# Patient Record
Sex: FEMALE | Race: BLACK OR AFRICAN AMERICAN | NOT HISPANIC OR LATINO | Employment: FULL TIME | URBAN - METROPOLITAN AREA
[De-identification: names, ages, dates, MRNs, and addresses within clinical notes are randomized per-mention and may not be internally consistent; named-entity substitution may affect disease eponyms.]

---

## 2021-07-23 LAB — POC BETA-HCG (QUAL): NEGATIVE

## 2021-09-27 LAB — POC BETA-HCG (QUAL): NEGATIVE

## 2022-04-10 ENCOUNTER — HISTORICAL (OUTPATIENT)
Dept: ADMINISTRATIVE | Facility: HOSPITAL | Age: 27
End: 2022-04-10
Payer: MEDICAID

## 2022-04-26 VITALS
BODY MASS INDEX: 23.71 KG/M2 | SYSTOLIC BLOOD PRESSURE: 111 MMHG | HEIGHT: 64 IN | DIASTOLIC BLOOD PRESSURE: 77 MMHG | WEIGHT: 138.88 LBS

## 2022-06-08 DIAGNOSIS — R87.629 ABNORMAL VAGINAL PAP SMEAR: Primary | ICD-10-CM

## 2022-09-21 ENCOUNTER — HISTORICAL (OUTPATIENT)
Dept: ADMINISTRATIVE | Facility: HOSPITAL | Age: 27
End: 2022-09-21
Payer: MEDICAID

## 2023-07-10 ENCOUNTER — HOSPITAL ENCOUNTER (EMERGENCY)
Facility: HOSPITAL | Age: 28
Discharge: HOME | End: 2023-07-11
Attending: FAMILY MEDICINE
Payer: MEDICAID

## 2023-07-10 DIAGNOSIS — N30.01 ACUTE CYSTITIS WITH HEMATURIA: Primary | ICD-10-CM

## 2023-07-10 LAB
B-HCG UR QL: NEGATIVE
CTP QC/QA: YES

## 2023-07-10 PROCEDURE — 87088 URINE BACTERIA CULTURE: CPT | Performed by: PHYSICIAN ASSISTANT

## 2023-07-10 PROCEDURE — 81025 URINE PREGNANCY TEST: CPT | Performed by: PHYSICIAN ASSISTANT

## 2023-07-10 PROCEDURE — 81001 URINALYSIS AUTO W/SCOPE: CPT | Performed by: PHYSICIAN ASSISTANT

## 2023-07-10 PROCEDURE — 99284 EMERGENCY DEPT VISIT MOD MDM: CPT

## 2023-07-10 RX ORDER — KETOROLAC TROMETHAMINE 10 MG/1
10 TABLET, FILM COATED ORAL
Status: COMPLETED | OUTPATIENT
Start: 2023-07-11 | End: 2023-07-11

## 2023-07-11 VITALS
DIASTOLIC BLOOD PRESSURE: 75 MMHG | HEIGHT: 64 IN | RESPIRATION RATE: 16 BRPM | WEIGHT: 163.25 LBS | SYSTOLIC BLOOD PRESSURE: 130 MMHG | HEART RATE: 80 BPM | TEMPERATURE: 98 F | OXYGEN SATURATION: 100 % | BODY MASS INDEX: 27.87 KG/M2

## 2023-07-11 LAB
APPEARANCE UR: ABNORMAL
BACTERIA #/AREA URNS AUTO: ABNORMAL /HPF
BILIRUB UR QL STRIP.AUTO: NEGATIVE MG/DL
COLOR UR: YELLOW
GLUCOSE UR QL STRIP.AUTO: NORMAL MG/DL
HYALINE CASTS #/AREA URNS LPF: ABNORMAL /LPF
KETONES UR QL STRIP.AUTO: NEGATIVE MG/DL
LEUKOCYTE ESTERASE UR QL STRIP.AUTO: 500 UNIT/L
MUCOUS THREADS URNS QL MICRO: ABNORMAL /LPF
NITRITE UR QL STRIP.AUTO: NEGATIVE
NON-SQ EPI CELLS URNS QL MICRO: ABNORMAL /HPF
PH UR STRIP.AUTO: 6 [PH]
PROT UR QL STRIP.AUTO: ABNORMAL MG/DL
RBC #/AREA URNS AUTO: >100 /HPF
RBC UR QL AUTO: ABNORMAL UNIT/L
SP GR UR STRIP.AUTO: 1.03
SQUAMOUS #/AREA URNS LPF: ABNORMAL /HPF
UNIDENT CRYS #/AREA URNS HPF: ABNORMAL /HPF
UROBILINOGEN UR STRIP-ACNC: ABNORMAL MG/DL
WBC #/AREA URNS AUTO: >100 /HPF
WBC CLUMPS UR QL AUTO: ABNORMAL /HPF

## 2023-07-11 PROCEDURE — 25000003 PHARM REV CODE 250: Performed by: PHYSICIAN ASSISTANT

## 2023-07-11 RX ORDER — PHENAZOPYRIDINE HYDROCHLORIDE 100 MG/1
100 TABLET, FILM COATED ORAL
Status: COMPLETED | OUTPATIENT
Start: 2023-07-11 | End: 2023-07-11

## 2023-07-11 RX ORDER — CIPROFLOXACIN 500 MG/1
500 TABLET ORAL 2 TIMES DAILY
Qty: 10 TABLET | Refills: 0 | Status: SHIPPED | OUTPATIENT
Start: 2023-07-11 | End: 2023-07-16

## 2023-07-11 RX ORDER — PHENAZOPYRIDINE HYDROCHLORIDE 100 MG/1
100 TABLET, FILM COATED ORAL 3 TIMES DAILY PRN
Qty: 9 TABLET | Refills: 0 | Status: SHIPPED | OUTPATIENT
Start: 2023-07-11

## 2023-07-11 RX ORDER — CIPROFLOXACIN 500 MG/1
500 TABLET ORAL
Status: COMPLETED | OUTPATIENT
Start: 2023-07-11 | End: 2023-07-11

## 2023-07-11 RX ADMIN — PHENAZOPYRIDINE 100 MG: 100 TABLET ORAL at 12:07

## 2023-07-11 RX ADMIN — KETOROLAC TROMETHAMINE 10 MG: 10 TABLET, FILM COATED ORAL at 12:07

## 2023-07-11 RX ADMIN — CIPROFLOXACIN 500 MG: 500 TABLET, FILM COATED ORAL at 01:07

## 2023-07-11 NOTE — ED PROVIDER NOTES
Encounter Date: 7/10/2023     History     Chief Complaint   Patient presents with    Dysuria     Pt c/o burning, painful urination today with pelvic pain and urinary frequency and increased urge.      Patient with no pmhx presents today c/o dysuria, increased frequency, and suprapubic discomfort that started today. She denies any other associated symptoms. No exacerbating or relieving factors.     The history is provided by the patient. No  was used.   Review of patient's allergies indicates:  No Known Allergies  No past medical history on file.  No past surgical history on file.  No family history on file.     Review of Systems   Constitutional:  Negative for chills and fever.   Respiratory:  Negative for cough, chest tightness and shortness of breath.    Cardiovascular:  Negative for chest pain, palpitations and leg swelling.   Gastrointestinal:  Negative for abdominal pain, constipation, diarrhea, nausea and vomiting.   Genitourinary:  Positive for dysuria and frequency. Negative for flank pain, hematuria, menstrual problem, vaginal bleeding, vaginal discharge and vaginal pain.   Musculoskeletal:  Negative for arthralgias and myalgias.   Skin:  Negative for rash.   Neurological:  Negative for syncope, light-headedness and headaches.   All other systems reviewed and are negative.    Physical Exam     Initial Vitals [07/10/23 2323]   BP Pulse Resp Temp SpO2   130/75 91 16 97.5 °F (36.4 °C) 100 %      MAP       --         Physical Exam    Nursing note and vitals reviewed.  Constitutional: She appears well-developed and well-nourished. She is not diaphoretic. No distress.   HENT:   Head: Normocephalic and atraumatic.   Mouth/Throat: Oropharynx is clear and moist. No oropharyngeal exudate.   Eyes: Conjunctivae and EOM are normal.   Neck: Neck supple.   Normal range of motion.  Cardiovascular:  Normal rate, regular rhythm, normal heart sounds and intact distal pulses.           Pulmonary/Chest:  Breath sounds normal. No respiratory distress.   Abdominal: Abdomen is soft. Bowel sounds are normal. She exhibits no distension. There is abdominal tenderness in the suprapubic area.   No right CVA tenderness.  No left CVA tenderness. There is no rebound and no guarding.   Musculoskeletal:         General: No edema.      Cervical back: Normal range of motion and neck supple.     Neurological: She is alert and oriented to person, place, and time. GCS score is 15. GCS eye subscore is 4. GCS verbal subscore is 5. GCS motor subscore is 6.   Skin: Skin is warm and dry. Capillary refill takes less than 2 seconds. No rash noted.   Psychiatric: She has a normal mood and affect.     ED Course   Procedures  Labs Reviewed   URINALYSIS, REFLEX TO URINE CULTURE - Abnormal; Notable for the following components:       Result Value    Appearance, UA Cloudy (*)     Protein, UA 2+ (*)     Blood, UA 3+ (*)     Urobilinogen, UA 1+ (*)     Leukocyte Esterase,  (*)     WBC, UA >100 (*)     WBC Clumps, UA Many (*)     Bacteria, UA Occ (*)     Squamous Epithelial Cells, UA Few (*)     Mucous, UA Trace (*)     Non-Squamos Epithelial, UA Trace (*)     RBC, UA >100 (*)     All other components within normal limits   CULTURE, URINE   POCT URINE PREGNANCY          Imaging Results    None          Medications   ciprofloxacin HCl tablet 500 mg (has no administration in time range)   ketorolac tablet 10 mg (10 mg Oral Given 7/11/23 0002)   phenazopyridine tablet 100 mg (100 mg Oral Given 7/11/23 0014)     Medical Decision Making:   History:   Old Records Summarized: other records and records from clinic visits.  Initial Assessment:   Patient with dysuria and urinary frequency that started today  Differential Diagnosis:   Urinary tract infection, interstitial cystitis, bladder spasms   Clinical Tests:   Lab Tests: Ordered and Reviewed  Patient is non-toxic appearing. Her vitals are stable. Labs reviewed and discussed. Rx for cipro and  pyridium sent to pharmacy. Advised to f/u with pcp. Stable for discharge. ED precautions given.            ED Course as of 07/11/23 0052   Tue Jul 11, 2023 0049 Occult Blood UA(!): 3+ [SA]   0049 Leukocytes, UA(!): 500 [SA]   0049 WBC, UA(!): >100 [SA]   0049 WBC Clumps, UA(!): Many [SA]   0049 Bacteria, UA(!): Occ [SA]   0049 Squamous Epithelial Cells, UA(!): Few [SA]   0049 Mucous, UA(!): Trace [SA]   0049 Non-Squamos Epithelial, UA(!): Trace [SA]   0049 RBC, UA(!): >100 [SA]      ED Course User Index  [SA] SANYA Salas             Clinical Impression:   Final diagnoses:  [N30.01] Acute cystitis with hematuria (Primary)      ED Disposition Condition    Discharge Stable          ED Prescriptions       Medication Sig Dispense Start Date End Date Auth. Provider    ciprofloxacin HCl (CIPRO) 500 MG tablet Take 1 tablet (500 mg total) by mouth 2 (two) times daily. for 5 days 10 tablet 7/11/2023 7/16/2023 SANYA Salas    phenazopyridine (PYRIDIUM) 100 MG tablet Take 1 tablet (100 mg total) by mouth 3 (three) times daily as needed for Pain. 9 tablet 7/11/2023 -- SANYA Salas          Follow-up Information       Follow up With Specialties Details Why Contact Info    [facility] Emergency Medicine  If symptoms worsen return to ED immediately [address] [facility] [id number] [phone]    [facility] In 2 days  [address] [facility] [id number] [phone]      Primary Care Provider within 1-2 days  Go in 2 days               SANYA Salas  07/11/23 0052

## 2023-07-13 LAB — BACTERIA UR CULT: NO GROWTH
